# Patient Record
Sex: FEMALE | Race: ASIAN | NOT HISPANIC OR LATINO
[De-identification: names, ages, dates, MRNs, and addresses within clinical notes are randomized per-mention and may not be internally consistent; named-entity substitution may affect disease eponyms.]

---

## 2022-12-20 PROBLEM — Z00.00 ENCOUNTER FOR PREVENTIVE HEALTH EXAMINATION: Status: ACTIVE | Noted: 2022-12-20

## 2023-01-05 ENCOUNTER — APPOINTMENT (OUTPATIENT)
Dept: THORACIC SURGERY | Facility: CLINIC | Age: 73
End: 2023-01-05
Payer: MEDICARE

## 2023-01-05 VITALS
HEIGHT: 61.02 IN | WEIGHT: 130 LBS | OXYGEN SATURATION: 98 % | SYSTOLIC BLOOD PRESSURE: 108 MMHG | DIASTOLIC BLOOD PRESSURE: 65 MMHG | BODY MASS INDEX: 24.55 KG/M2 | RESPIRATION RATE: 16 BRPM | HEART RATE: 76 BPM | TEMPERATURE: 96.8 F

## 2023-01-05 DIAGNOSIS — Z80.0 FAMILY HISTORY OF MALIGNANT NEOPLASM OF DIGESTIVE ORGANS: ICD-10-CM

## 2023-01-05 DIAGNOSIS — Z85.850 PERSONAL HISTORY OF MALIGNANT NEOPLASM OF THYROID: ICD-10-CM

## 2023-01-05 DIAGNOSIS — D64.9 ANEMIA, UNSPECIFIED: ICD-10-CM

## 2023-01-05 DIAGNOSIS — I25.10 ATHEROSCLEROTIC HEART DISEASE OF NATIVE CORONARY ARTERY W/OUT ANGINA PECTORIS: ICD-10-CM

## 2023-01-05 DIAGNOSIS — N18.9 CHRONIC KIDNEY DISEASE, UNSPECIFIED: ICD-10-CM

## 2023-01-05 DIAGNOSIS — I48.91 UNSPECIFIED ATRIAL FIBRILLATION: ICD-10-CM

## 2023-01-05 DIAGNOSIS — E78.5 HYPERLIPIDEMIA, UNSPECIFIED: ICD-10-CM

## 2023-01-05 DIAGNOSIS — I10 ESSENTIAL (PRIMARY) HYPERTENSION: ICD-10-CM

## 2023-01-05 DIAGNOSIS — E11.9 TYPE 2 DIABETES MELLITUS W/OUT COMPLICATIONS: ICD-10-CM

## 2023-01-05 DIAGNOSIS — N17.9 ACUTE KIDNEY FAILURE, UNSPECIFIED: ICD-10-CM

## 2023-01-05 PROCEDURE — 99205 OFFICE O/P NEW HI 60 MIN: CPT

## 2023-01-06 PROBLEM — E78.5 HLD (HYPERLIPIDEMIA): Status: ACTIVE | Noted: 2023-01-06

## 2023-01-06 PROBLEM — N18.9 CKD (CHRONIC KIDNEY DISEASE): Status: ACTIVE | Noted: 2023-01-06

## 2023-01-06 PROBLEM — I48.91 A-FIB: Status: ACTIVE | Noted: 2023-01-06

## 2023-01-06 PROBLEM — N17.9 AKI (ACUTE KIDNEY INJURY): Status: RESOLVED | Noted: 2023-01-06 | Resolved: 2023-01-06

## 2023-01-06 PROBLEM — D64.9 ANEMIA: Status: ACTIVE | Noted: 2023-01-06

## 2023-01-06 PROBLEM — Z80.0 FAMILY HISTORY OF LIVER CANCER: Status: ACTIVE | Noted: 2023-01-06

## 2023-01-06 PROBLEM — I10 HTN (HYPERTENSION): Status: ACTIVE | Noted: 2023-01-06

## 2023-01-06 PROBLEM — I25.10 CAD (CORONARY ARTERY DISEASE): Status: ACTIVE | Noted: 2023-01-06

## 2023-01-06 PROBLEM — Z85.850 HISTORY OF MALIGNANT NEOPLASM OF THYROID: Status: RESOLVED | Noted: 2023-01-06 | Resolved: 2023-01-06

## 2023-01-06 PROBLEM — E11.9 DIABETES MELLITUS: Status: ACTIVE | Noted: 2023-01-06

## 2023-01-06 PROBLEM — Z80.0 FAMILY HISTORY OF MALIGNANT NEOPLASM OF STOMACH: Status: ACTIVE | Noted: 2023-01-06

## 2023-01-06 RX ORDER — LEVOTHYROXINE SODIUM 112 MCG
112 TABLET ORAL
Refills: 0 | Status: ACTIVE | COMMUNITY

## 2023-01-06 RX ORDER — FOLIC ACID 1 MG/1
1 TABLET ORAL
Refills: 0 | Status: ACTIVE | COMMUNITY

## 2023-01-06 RX ORDER — HYDRALAZINE HYDROCHLORIDE 50 MG/1
50 TABLET ORAL
Refills: 0 | Status: ACTIVE | COMMUNITY

## 2023-01-06 RX ORDER — OLMESARTAN MEDOXOMIL 40 MG/1
40 TABLET, FILM COATED ORAL
Refills: 0 | Status: ACTIVE | COMMUNITY

## 2023-01-06 RX ORDER — SODIUM BICARBONATE 650 MG/1
650 TABLET ORAL
Refills: 0 | Status: ACTIVE | COMMUNITY

## 2023-01-06 RX ORDER — PNV NO.95/FERROUS FUM/FOLIC AC 28MG-0.8MG
TABLET ORAL
Refills: 0 | Status: ACTIVE | COMMUNITY

## 2023-01-06 RX ORDER — APIXABAN 5 MG/1
5 TABLET, FILM COATED ORAL
Refills: 0 | Status: ACTIVE | COMMUNITY

## 2023-01-06 RX ORDER — EZETIMIBE 10 MG/1
10 TABLET ORAL
Refills: 0 | Status: ACTIVE | COMMUNITY

## 2023-01-06 RX ORDER — NIFEDIPINE 60 MG
60 TABLET, EXTENDED RELEASE ORAL
Refills: 0 | Status: ACTIVE | COMMUNITY

## 2023-01-06 RX ORDER — FEBUXOSTAT 40 MG/1
40 TABLET ORAL
Refills: 0 | Status: ACTIVE | COMMUNITY

## 2023-01-06 NOTE — ASSESSMENT
[FreeTextEntry1] : Ms. MIGUEL A CLARK, 72 year old female, never smoker, w/ hx of HTN, CAD, DM, CVA, A-fib, anemia, MYESHA, gout, hypothyroidism, who presented to [facility] on 11/28/2022 with a 2-4 days of chest pain and hyperglycemia. Workup in the hospital including cardiac cath on 11/28/22 showed diffuse LAD disease with  of LCx. She also has received 3 sessions of HD for MYSEHA during the hospitalization. Poorly controlled DM with HbA1C at 9.2. She developed new onset of A-fib and elevated BP and has received 1u RBC due to anemia during hospital stay. \par \par CT Chest on 3/30/22:\par - multiple small nodules bilaterally, all 2 mm in size.\par \par I have reviewed the patient's medical records and diagnostic images at time of this office consultation and have made the following recommendation:\par 1. CT scan of 2022 reviewed with pt today.\par 2. RTC in March 2023 with new CT chest. \par \par \par I, , FABRICIO SHETH, personally performed the evaluation and management (E/M) services for this new patient.  That E/M includes conducting the initial examination, assessing all conditions, and establishing the plan of care.  Today, my ACP, Nikia Arias NP was here to observe my evaluation and management services for this patient to be followed going forward.\par

## 2023-01-06 NOTE — HISTORY OF PRESENT ILLNESS
[FreeTextEntry1] : Ms. MIGUEL A CLARK, 72 year old female, never smoker, w/ hx of HTN, CAD, DM, CVA, A-fib, anemia, MYESHA, gout, hypothyroidism, who presented to [facility] on 11/28/2022 with a 2-4 days of chest pain and hyperglycemia. Workup in the hospital including cardiac cath on 11/28/22 showed diffuse LAD disease with  of LCx. She also has received 3 sessions of HD for MYESHA during the hospitalization. Poorly controlled DM with HbA1C at 9.2. She developed new onset of A-fib and elevated BP and has received 1u RBC due to anemia during hospital stay. \par \par CT Chest on 3/30/22:\par - multiple small nodules bilaterally, all 2 mm in size.\par \par Pt presents today for CT Sx consultation, referred by Dr. Lorena Rivas. She denies fever, chills, cough, CP, SOB today, admits fatigue+. \par \par \par

## 2023-01-06 NOTE — CONSULT LETTER
[Dear  ___] : Dear  [unfilled], [Consult Letter:] : I had the pleasure of evaluating your patient, [unfilled]. [( Thank you for referring [unfilled] for consultation for _____ )] : Thank you for referring [unfilled] for consultation for [unfilled] [Please see my note below.] : Please see my note below. [Consult Closing:] : Thank you very much for allowing me to participate in the care of this patient.  If you have any questions, please do not hesitate to contact me. [Sincerely,] : Sincerely, [FreeTextEntry2] : Lorena Rivas MD ( PCP, referring) [FreeTextEntry3] : Carlos Domínguez MD, MPH \par System Director of Thoracic Surgery \par Director of Comprehensive Lung and Foregut [place] \par Professor Cardiovascular & Thoracic Surgery  \par [facility] [employer] at [facility]\par

## 2023-01-06 NOTE — PHYSICAL EXAM
[General Appearance - Alert] : alert [General Appearance - In No Acute Distress] : in no acute distress [General Appearance - Well Nourished] : well nourished [Neck Appearance] : the appearance of the neck was normal [Neck Cervical Mass (___cm)] : no neck mass was observed [Respiration, Rhythm And Depth] : normal respiratory rhythm and effort [Auscultation Breath Sounds / Voice Sounds] : lungs were clear to auscultation bilaterally [Heart Rate And Rhythm] : heart rate was normal and rhythm regular [Heart Sounds] : normal S1 and S2 [Examination Of The Chest] : the chest was normal in appearance [Chest Visual Inspection Thoracic Asymmetry] : no chest asymmetry [2+] : left 2+ [Bowel Sounds] : normal bowel sounds [Abdomen Soft] : soft [No CVA Tenderness] : no ~M costovertebral angle tenderness [Skin Color & Pigmentation] : normal skin color and pigmentation [Skin Turgor] : normal skin turgor [] : no rash [No Focal Deficits] : no focal deficits [Oriented To Time, Place, And Person] : oriented to person, place, and time [Affect] : the affect was normal [Mood] : the mood was normal

## 2023-03-30 ENCOUNTER — APPOINTMENT (OUTPATIENT)
Dept: THORACIC SURGERY | Facility: CLINIC | Age: 73
End: 2023-03-30
Payer: MEDICARE

## 2023-03-30 DIAGNOSIS — R91.8 OTHER NONSPECIFIC ABNORMAL FINDING OF LUNG FIELD: ICD-10-CM

## 2023-03-30 PROCEDURE — 99442: CPT

## 2023-03-31 PROBLEM — R91.8 LUNG NODULES: Status: ACTIVE | Noted: 2023-01-04

## 2023-03-31 NOTE — HISTORY OF PRESENT ILLNESS
[FreeTextEntry1] : Ms. MIGUEL A CLARK, 72 year old female, never smoker, w/ hx of HTN, CAD, DM, CVA, A-fib, anemia, MYESHA, gout, hypothyroidism, who presented to [facility] on 11/28/2022 with a 2-4 days of chest pain and hyperglycemia. Workup in the hospital including cardiac cath on 11/28/22 showed diffuse LAD disease with  of LCx. She also has received 3 sessions of HD for MYESHA during the hospitalization. Poorly controlled DM with HbA1C at 9.2. She developed new onset of A-fib and elevated BP and has received 1u RBC due to anemia during hospital stay. \par \par CT Chest on 3/30/22:\par - multiple small nodules bilaterally, all 2 mm in size.\par \par CT Chest on 3/23/23:\par - No significant change in pulmonary nodules up to 0.3cm\par - Evidence of prior granulomatous disease\par - Coronary artery calcifications\par - Cholelithiasis\par \par Patient is here today for 1 year follow up. \par \par

## 2023-03-31 NOTE — ASSESSMENT
[FreeTextEntry1] : Ms. MIGUEL A CLARK, 73 year old female, never smoker, w/ hx of HTN, CAD, DM, CVA, A-fib, anemia, MYESHA, gout, hypothyroidism, who presented to [facility] on 11/28/2022 with a 2-4 days of chest pain and hyperglycemia. Workup in the hospital including cardiac cath on 11/28/22 showed diffuse LAD disease with  of LCx. She also has received 3 sessions of HD for MYESHA during the hospitalization. Poorly controlled DM with HbA1C at 9.2. She developed new onset of A-fib and elevated BP and has received 1u RBC due to anemia during hospital stay. \par \par CT Chest on 3/23/23:\par - No significant change in pulmonary nodules up to 0.3cm\par - Evidence of prior granulomatous disease\par - Coronary artery calcifications\par - Cholelithiasis\par \par I have reviewed the patient's medical records and diagnostic images at time of this office consultation and have made the following recommendation:\par 1. CT scan reviewed, stable scan. RTC in 1 yr with CT Chest w/o contrast.\par \par \par I, FABRICIO Krishnan, personally performed the evaluation and management (E/M) services for this established patient who presents today with (a) new problem(s)/exacerbation of (an) existing condition(s).  That E/M includes conducting the examination, assessing all new/exacerbated conditions, and establishing a new plan of care.  Today, my ACP, Carmen Foote NP was here to observe my evaluation and management services for this new problem/exacerbated condition to be followed going forward.\par \par

## 2023-03-31 NOTE — REASON FOR VISIT
[Home] : at home, [unfilled] , at the time of the visit. [Medical Office: ([facility])___] : at the medical office located in  [Patient] : the patient [This encounter was initiated by telehealth (audio with video) and converted to telephone (audio only) due to technical difficulties.] : This encounter was initiated by telehealth (audio with video) and converted to telephone (audio only) due to technical difficulties.

## 2023-03-31 NOTE — CONSULT LETTER
[FreeTextEntry2] : Lorena Rivas MD ( PCP, referring) [FreeTextEntry3] : Carlos Domínguez MD, MPH \par System Director of Thoracic Surgery \par Director of Comprehensive Lung and Foregut [place] \par Professor Cardiovascular & Thoracic Surgery  \par [facility] [employer] at [facility]\par